# Patient Record
Sex: MALE | Race: ASIAN | NOT HISPANIC OR LATINO | URBAN - METROPOLITAN AREA
[De-identification: names, ages, dates, MRNs, and addresses within clinical notes are randomized per-mention and may not be internally consistent; named-entity substitution may affect disease eponyms.]

---

## 2017-05-08 ENCOUNTER — AMBULATORY - HEALTHEAST (OUTPATIENT)
Dept: LAB | Facility: CLINIC | Age: 37
End: 2017-05-08

## 2017-05-08 ENCOUNTER — AMBULATORY - HEALTHEAST (OUTPATIENT)
Dept: FAMILY MEDICINE | Facility: CLINIC | Age: 37
End: 2017-05-08

## 2017-05-08 DIAGNOSIS — Z86.19 HISTORY OF STRONGYLOIDIASIS: ICD-10-CM

## 2017-05-15 ENCOUNTER — OFFICE VISIT - HEALTHEAST (OUTPATIENT)
Dept: FAMILY MEDICINE | Facility: CLINIC | Age: 37
End: 2017-05-15

## 2017-05-15 DIAGNOSIS — B78.9 STRONGYLOIDIASIS: ICD-10-CM

## 2017-05-15 DIAGNOSIS — Z23 NEED FOR VACCINATION: ICD-10-CM

## 2017-05-15 ASSESSMENT — MIFFLIN-ST. JEOR: SCORE: 1237.3

## 2017-08-09 ENCOUNTER — OFFICE VISIT - HEALTHEAST (OUTPATIENT)
Dept: FAMILY MEDICINE | Facility: CLINIC | Age: 37
End: 2017-08-09

## 2017-08-09 DIAGNOSIS — Z28.39 IMMUNIZATION DEFICIENCY: ICD-10-CM

## 2017-08-09 ASSESSMENT — MIFFLIN-ST. JEOR: SCORE: 1233.11

## 2021-05-30 VITALS — WEIGHT: 100.3 LBS | HEIGHT: 61 IN | BODY MASS INDEX: 18.94 KG/M2

## 2021-05-31 VITALS — HEIGHT: 61 IN | BODY MASS INDEX: 18.93 KG/M2 | WEIGHT: 100.25 LBS

## 2021-06-10 NOTE — PROGRESS NOTES
"HPI - 38 yo male here to f/u on strongyloides and shots.     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   Rechecked titer.      Ref. Range 7/1/2016 09:28 5/8/2017 10:04   Strongyloides Ab, IgG, S Latest Ref Range: Negative  Positive (!) Negative     Due for vaccines - Tdap and MMR    Social:   He is working at [employer].     Vitals:    05/15/17 1050   BP: 100/60   Pulse: 88   Resp: 14   Temp: 98.7  F (37.1  C)   TempSrc: Oral   SpO2: 98%   Weight: 100 lb 4.8 oz (45.5 kg)   Height: 5' 1.25\" (1.556 m)     PHYSICAL EXAM   General Appearance: Awake and alert, in no acute distress  HEENT: neck is supple  CV: regular rate  Resp: No respiratory distress. Breathing comfortably  Musculoskeletal: moving limbs comfortably with not deficits or deformities  Skin: no rashes noted    A/P  Strongyloides -  Cleared and now negative    Due for vaccines - Tdap and MMR given today    "

## 2021-06-12 NOTE — PROGRESS NOTES
"S:  Patient seen in clinic today for green card exam and update of immunizations.  There is no past history of immunization reactions.  No recent fevers or shortness of breath.     Patient Active Problem List   Diagnosis     Tuberculosis exposure - wife QFT pos     Strongyloidiasis       O:  /62 (Patient Site: Right Arm, Patient Position: Sitting, Cuff Size: Adult Regular)  Pulse 68  Temp 98.3  F (36.8  C) (Oral)   Resp 16  Ht 5' 1\" (1.549 m)  Wt 100 lb 4 oz (45.5 kg)  BMI 18.94 kg/m2  General - alert, NAD  CV - RRR  Resp - lunds clear to auscultation    A/P:  Green Card/update imm.  Counseling done on immunization history and requirements with the patient.  Reviewed available immunization history and relevant lab records with patient and family.  Immunizations given as documented in the EHR and on form.  Acetaminophen as needed for post-immunization fever or myalgias.  [place] Citizenship and Immigration Services form [id number] completed today with the patient.  Given to patient in a sealed labeled envelope and a copy is being scanned into the EHR.  Please see that form for further details.  Patient directed to follow-up in clinic for routine and preventative care.      "

## 2021-06-15 ENCOUNTER — IMMUNIZATION (OUTPATIENT)
Dept: FAMILY MEDICINE | Facility: CLINIC | Age: 41
End: 2021-06-15
Payer: COMMERCIAL

## 2021-06-15 PROBLEM — B78.9 STRONGYLOIDIASIS: Status: ACTIVE | Noted: 2017-05-15

## 2021-06-15 PROCEDURE — 91301 PR COVID VAC MODERNA 100 MCG/0.5 ML IM: CPT

## 2021-06-15 PROCEDURE — 0011A PR COVID VAC MODERNA 100 MCG/0.5 ML IM: CPT

## 2021-07-13 ENCOUNTER — IMMUNIZATION (OUTPATIENT)
Dept: FAMILY MEDICINE | Facility: CLINIC | Age: 41
End: 2021-07-13
Attending: FAMILY MEDICINE
Payer: COMMERCIAL

## 2021-07-13 PROCEDURE — 0012A PR COVID VAC MODERNA 100 MCG/0.5 ML IM: CPT

## 2021-07-13 PROCEDURE — 91301 PR COVID VAC MODERNA 100 MCG/0.5 ML IM: CPT

## 2021-07-13 NOTE — NURSING NOTE
Due to patient being non-English speaking/uses sign language, an  was used for this visit. Only for face-to-face interpretation by an external agency, date and length of interpretation can be found on the scanned worksheet.     name: Warner Olson  Agency: [employer]  Language: Brittney   Telephone number: [phone]  Type of interpretation: Face-to-face, spoken

## 2021-11-29 ENCOUNTER — DOCUMENTATION ONLY (OUTPATIENT)
Dept: FAMILY MEDICINE | Facility: CLINIC | Age: 41
End: 2021-11-29
Payer: COMMERCIAL

## 2022-01-12 ENCOUNTER — APPOINTMENT (OUTPATIENT)
Dept: LAB | Facility: CLINIC | Age: 42
End: 2022-01-12
Payer: COMMERCIAL

## 2022-01-15 ENCOUNTER — TELEPHONE (OUTPATIENT)
Dept: FAMILY MEDICINE | Facility: CLINIC | Age: 42
End: 2022-01-15
Payer: COMMERCIAL

## 2022-01-16 NOTE — TELEPHONE ENCOUNTER
"Coronavirus (COVID-19) Notification    Pt acknowleded it may take 7-10d to receive copy of covid results in the mail. Verfied address. He stated he needed documentation for his children, as they tested positive. Looked up 1st child information, pt didn't know child's name he had to ask his wife-Monica Umaña, phone given to Monica to give child's information to look if notice will also be mailed. I informed both pt and wife that there was no information that came up under the name and  they had given me for their child's covid result. Wife stated their child had tested positive through school. With that being said, informed that if their child wasn't tested via [facility], there is no information I can give as far as notices. Pt understood. I had a hard time discussing w/ pt isolation as he had asked several times when his last day of iso would be. No further questions.      Caller Name (Patient, parent, daughter/son, grandparent, etc)  Pt and Tamie     Reason for call  Notify of Positive Coronavirus (COVID-19) lab results, assess symptoms,  review  i-marker [place] recommendations    Lab Result    Lab test:  2019-nCoV rRt-PCR or SARS-CoV-2 PCR    Oropharyngeal AND/OR nasopharyngeal swabs is POSITIVE for 2019-nCoV RNA/SARS-COV-2 PCR (COVID-19 virus)    RN Recommendations/Instructions per [facility] Coronavirus COVID-19 recommendations    Brief introduction script  Introduce self then review script:  \"I am calling on behalf of [employer].  We were notified that your Coronavirus test (COVID-19) for was POSITIVE for the virus.  I have some information to relay to you but first I wanted to mention that the [place] Dept of Health will be contacting you shortly [it's possible MD already called Patient] to talk to you more about how you are feeling and other people you have had contact with who might now also have the virus.  Also, [facility] [employer] is Partnering with the [facility] for Covid-19 research, you " "may be contacted directly by research staff.\"    Assessment (Inquire about Patient's current symptoms)   Assessment   Current Symptoms at time of phone call: (if no symptoms, document No symptoms] none   Symptoms onset (if applicable) n/a     If at time of call, Patients symptoms hare worsened, the Patient should contact 911 or have someone drive them to Emergency Dept promptly:      If Patient calling 911, inform 911 personal that you have tested positive for the Coronavirus (COVID-19).  Place mask on and await 911 to arrive.    If Emergency Dept, If possible, please have another adult drive you to the Emergency Dept but you need to wear mask when in contact with other people.      Monoclonal Antibody Administration    You may be eligible to receive a new treatment with a monoclonal antibody for preventing hospitalization in patients at high risk for complications from COVID-19.   This medication is still experimental and available on a limited basis; it is given through an IV and must be given at an infusion center. Please note that not all people who are eligible will receive the medication since it is in limited supply.     Are you interested in being considered for this medication?  No.   Does the patient fit the criteria: No    Review information with Patient    Your result was positive. This means you have COVID-19 (coronavirus).  We have sent you a letter that reviews the information that I'll be reviewing with you now.    How can I protect others?    If you have symptoms: stay home and away from others (self-isolate) until:    You've had no fever--and no medicine that reduces fever--for 1 full day (24 hours). And       Your other symptoms have gotten better. For example, your cough or breathing has improved. And     At least 10 days have passed since your symptoms started. (If you've been told by a doctor that you have a weak immune system, wait 20 days.)     If you don't have symptoms: Stay home and away " from others (self-isolate) until at least 10 days have passed since your first positive COVID-19 test. (Date test collected)    During this time:    Stay in your own room, including for meals. Use your own bathroom if you can.    Stay away from others in your home. No hugging, kissing or shaking hands. No visitors.     Don't go to work, school or anywhere else.     Clean  high touch  surfaces often (doorknobs, counters, handles, etc.). Use a household cleaning spray or wipes. You'll find a full list on the EPA website at www.epa.gov/pesticide-registration/list-n-disinfectants-use-against-sars-cov-2.     Cover your mouth and nose with a mask, tissue or other face covering to avoid spreading germs.    Wash your hands and face often with soap and water.    Make a list of people you have been in close contact with recently, even if either of you wore a face covering.   - Start your list from 2 days before you became ill or had a positive test.  - Include anyone that was within 6 feet of you for a cumulative total of 15 minutes or more in 24 hours. (Example: if you sat next to Juancho for 5 minutes in the morning and 10 minutes in the afternoon, then you were in close contact for 15 minutes total that day. Juancho would be added to your list.)    A public health worker will call or text you. It is important that you answer. They will ask you questions about possible exposures to COVID-19, such as people you have been in direct contact with and places you have visited.    Tell the people on your list that you have COVID-19; they should stay away from others for 14 days starting from the last time they were in contact with you (unless you are told something different from a public health worker).     Caregivers in these groups are at risk for severe illness due to COVID-19:  o People 65 years and older  o People who live in a nursing home or long-term care facility  o People with chronic disease (lung, heart, cancer, diabetes,  kidney, liver, immunologic)  o People who have a weakened immune system, including those who:  - Are in cancer treatment  - Take medicine that weakens the immune system, such as corticosteroids  - Had a bone marrow or organ transplant  - Have an immune deficiency  - Have poorly controlled HIV or AIDS  - Are obese (body mass index of 40 or higher)  - Smoke regularly    Caregivers should wear gloves while washing dishes, handling laundry and cleaning bedrooms and bathrooms.    Wash and dry laundry with special caution. Don't shake dirty laundry, and use the warmest water setting you can.    If you have a weakened immune system, ask your doctor about other actions you should take.    For more tips, go to www.cdc.gov/coronavirus/2019-ncov/downloads/10Things.pdf.    You should not go back to work until you meet the guidelines above for ending your home isolation. You don't need to be retested for COVID-19 before going back to work--studies show that you won't spread the virus if it's been at least 10 days since your symptoms started (or 20 days, if you have a weak immune system).    Employers: This document serves as formal notice of your employee's medical guidelines for going back to work. They must meet the above guidelines before going back to work in person.    How can I take care of myself?    1. Get lots of rest. Drink extra fluids (unless a doctor has told you not to).    2. Take Tylenol (acetaminophen) for fever or pain. If you have liver or kidney problems, ask your family doctor if it's okay to take Tylenol.     Take either:     650 mg (two 325 mg pills) every 4 to 6 hours, or     1,000 mg (two 500 mg pills) every 8 hours as needed.     Note: Don't take more than 3,000 mg in one day. Acetaminophen is found in many medicines (both prescribed and over-the-counter medicines). Read all labels to be sure you don't take too much.    For children, check the Tylenol bottle for the right dose (based on their age or  weight).    3. If you have other health problems (like cancer, heart failure, an organ transplant or severe kidney disease): Call your specialty clinic if you don't feel better in the next 2 days.    4. Know when to call 911: Emergency warning signs include:    Trouble breathing or shortness of breath    Pain or pressure in the chest that doesn't go away    Feeling confused like you haven't felt before, or not being able to wake up    Bluish-colored lips or face    5. Sign up for [employer]. We know it's scary to hear that you have COVID-19. We want to track your symptoms to make sure you're okay over the next 2 weeks. Please look for an email from [employer]--this is a free, online program that we'll use to keep in touch. To sign up, follow the link in the email. Learn more at [email or web address].    Where can I get more information?    [facility]: [email or web address]    Coronavirus Basics: www.[email or web address].[email or web address]./diseases/coronavirus/basics.html    What to Do If You're Sick: www.cdc.gov/coronavirus/2019-ncov/about/steps-when-sick.html    Ending Home Isolation: www.cdc.gov/coronavirus/2019-ncov/hcp/disposition-in-home-patients.html     Caring for Someone with COVID-19: www.cdc.gov/coronavirus/2019-ncov/if-you-are-sick/care-for-someone.html     [facility] clinical trials (COVID-19 research studies): [email or web address].[email or web address]-clinical-trials     A Positive COVID-19 letter will be sent via [employer] or the mail. (Exception, no letters sent to Presurgerical/Preprocedure Patients)    Robyn Martin